# Patient Record
Sex: MALE | Race: WHITE | NOT HISPANIC OR LATINO | Employment: OTHER | URBAN - METROPOLITAN AREA
[De-identification: names, ages, dates, MRNs, and addresses within clinical notes are randomized per-mention and may not be internally consistent; named-entity substitution may affect disease eponyms.]

---

## 2020-01-01 ENCOUNTER — APPOINTMENT (OUTPATIENT)
Dept: NON INVASIVE DIAGNOSTICS | Facility: HOSPITAL | Age: 80
DRG: 250 | End: 2020-01-01
Attending: INTERNAL MEDICINE

## 2020-01-01 ENCOUNTER — APPOINTMENT (EMERGENCY)
Dept: RADIOLOGY | Facility: HOSPITAL | Age: 80
DRG: 250 | End: 2020-01-01

## 2020-01-01 ENCOUNTER — HOSPITAL ENCOUNTER (INPATIENT)
Facility: HOSPITAL | Age: 80
LOS: 1 days | DRG: 250 | End: 2020-01-27
Attending: EMERGENCY MEDICINE | Admitting: INTERNAL MEDICINE

## 2020-01-01 VITALS
RESPIRATION RATE: 30 BRPM | OXYGEN SATURATION: 95 % | TEMPERATURE: 96.8 F | SYSTOLIC BLOOD PRESSURE: 130 MMHG | WEIGHT: 187.39 LBS | HEART RATE: 60 BPM | DIASTOLIC BLOOD PRESSURE: 62 MMHG

## 2020-01-01 DIAGNOSIS — R07.9 CHEST PAIN: ICD-10-CM

## 2020-01-01 DIAGNOSIS — I21.9 AMI (ACUTE MYOCARDIAL INFARCTION) (HCC): ICD-10-CM

## 2020-01-01 DIAGNOSIS — I44.2 HEART BLOCK AV THIRD DEGREE (HCC): ICD-10-CM

## 2020-01-01 DIAGNOSIS — R00.1 BRADYCARDIA: ICD-10-CM

## 2020-01-01 DIAGNOSIS — I21.01 ACUTE ST ELEVATION MYOCARDIAL INFARCTION (STEMI) INVOLVING LEFT MAIN CORONARY ARTERY (HCC): ICD-10-CM

## 2020-01-01 DIAGNOSIS — I21.3 STEMI (ST ELEVATION MYOCARDIAL INFARCTION) (HCC): Primary | ICD-10-CM

## 2020-01-01 LAB
ALBUMIN SERPL BCP-MCNC: 3.2 G/DL (ref 3.5–5)
ALP SERPL-CCNC: 57 U/L (ref 46–116)
ALT SERPL W P-5'-P-CCNC: 77 U/L (ref 12–78)
ANION GAP SERPL CALCULATED.3IONS-SCNC: 17 MMOL/L (ref 4–13)
APTT PPP: 25 SECONDS (ref 23–37)
AST SERPL W P-5'-P-CCNC: 261 U/L (ref 5–45)
ATRIAL RATE: 65 BPM
BASE EXCESS BLDA CALC-SCNC: -15 MMOL/L (ref -2–3)
BASOPHILS # BLD AUTO: 0.03 THOUSANDS/ΜL (ref 0–0.1)
BASOPHILS NFR BLD AUTO: 0 % (ref 0–1)
BILIRUB SERPL-MCNC: 0.66 MG/DL (ref 0.2–1)
BUN SERPL-MCNC: 27 MG/DL (ref 5–25)
CA-I BLD-SCNC: 0.97 MMOL/L (ref 1.12–1.32)
CALCIUM SERPL-MCNC: 8.5 MG/DL (ref 8.3–10.1)
CHLORIDE SERPL-SCNC: 102 MMOL/L (ref 100–108)
CO2 SERPL-SCNC: 16 MMOL/L (ref 21–32)
CREAT SERPL-MCNC: 2.43 MG/DL (ref 0.6–1.3)
EOSINOPHIL # BLD AUTO: 0.01 THOUSAND/ΜL (ref 0–0.61)
EOSINOPHIL NFR BLD AUTO: 0 % (ref 0–6)
ERYTHROCYTE [DISTWIDTH] IN BLOOD BY AUTOMATED COUNT: 13.8 % (ref 11.6–15.1)
GFR SERPL CREATININE-BSD FRML MDRD: 25 ML/MIN/1.73SQ M
GLUCOSE SERPL-MCNC: 317 MG/DL (ref 65–140)
GLUCOSE SERPL-MCNC: 332 MG/DL (ref 65–140)
HCO3 BLDA-SCNC: 12.5 MMOL/L (ref 24–30)
HCT VFR BLD AUTO: 45.5 % (ref 36.5–49.3)
HCT VFR BLD CALC: 46 % (ref 36.5–49.3)
HGB BLD-MCNC: 14.5 G/DL (ref 12–17)
HGB BLDA-MCNC: 15.6 G/DL (ref 12–17)
IMM GRANULOCYTES # BLD AUTO: 0.21 THOUSAND/UL (ref 0–0.2)
IMM GRANULOCYTES NFR BLD AUTO: 1 % (ref 0–2)
INR PPP: 1.35 (ref 0.84–1.19)
LYMPHOCYTES # BLD AUTO: 1.21 THOUSANDS/ΜL (ref 0.6–4.47)
LYMPHOCYTES NFR BLD AUTO: 7 % (ref 14–44)
MCH RBC QN AUTO: 31.3 PG (ref 26.8–34.3)
MCHC RBC AUTO-ENTMCNC: 31.9 G/DL (ref 31.4–37.4)
MCV RBC AUTO: 98 FL (ref 82–98)
MONOCYTES # BLD AUTO: 0.69 THOUSAND/ΜL (ref 0.17–1.22)
MONOCYTES NFR BLD AUTO: 4 % (ref 4–12)
NEUTROPHILS # BLD AUTO: 14.18 THOUSANDS/ΜL (ref 1.85–7.62)
NEUTS SEG NFR BLD AUTO: 88 % (ref 43–75)
NRBC BLD AUTO-RTO: 0 /100 WBCS
PCO2 BLD: 14 MMOL/L (ref 21–32)
PCO2 BLD: 34.5 MM HG (ref 42–50)
PH BLD: 7.17 [PH] (ref 7.3–7.4)
PLATELET # BLD AUTO: 350 THOUSANDS/UL (ref 149–390)
PMV BLD AUTO: 9.6 FL (ref 8.9–12.7)
PO2 BLD: 18 MM HG (ref 35–45)
POTASSIUM BLD-SCNC: 4.5 MMOL/L (ref 3.5–5.3)
POTASSIUM SERPL-SCNC: 4.7 MMOL/L (ref 3.5–5.3)
PROT SERPL-MCNC: 7.1 G/DL (ref 6.4–8.2)
PROTHROMBIN TIME: 16.2 SECONDS (ref 11.6–14.5)
QRS AXIS: -84 DEGREES
QRSD INTERVAL: 170 MS
QT INTERVAL: 520 MS
QTC INTERVAL: 413 MS
RBC # BLD AUTO: 4.64 MILLION/UL (ref 3.88–5.62)
SAO2 % BLD FROM PO2: 18 % (ref 60–85)
SODIUM BLD-SCNC: 136 MMOL/L (ref 136–145)
SODIUM SERPL-SCNC: 135 MMOL/L (ref 136–145)
SPECIMEN SOURCE: ABNORMAL
T WAVE AXIS: 102 DEGREES
TROPONIN I SERPL-MCNC: >40 NG/ML
VENTRICULAR RATE: 38 BPM
WBC # BLD AUTO: 16.33 THOUSAND/UL (ref 4.31–10.16)

## 2020-01-01 PROCEDURE — 82803 BLOOD GASES ANY COMBINATION: CPT

## 2020-01-01 PROCEDURE — 93010 ELECTROCARDIOGRAM REPORT: CPT | Performed by: INTERNAL MEDICINE

## 2020-01-01 PROCEDURE — 36415 COLL VENOUS BLD VENIPUNCTURE: CPT | Performed by: EMERGENCY MEDICINE

## 2020-01-01 PROCEDURE — 92950 HEART/LUNG RESUSCITATION CPR: CPT

## 2020-01-01 PROCEDURE — 85014 HEMATOCRIT: CPT

## 2020-01-01 PROCEDURE — 5A1223Z PERFORMANCE OF CARDIAC PACING, CONTINUOUS: ICD-10-PCS | Performed by: INTERNAL MEDICINE

## 2020-01-01 PROCEDURE — 85025 COMPLETE CBC W/AUTO DIFF WBC: CPT | Performed by: EMERGENCY MEDICINE

## 2020-01-01 PROCEDURE — C1894 INTRO/SHEATH, NON-LASER: HCPCS | Performed by: INTERNAL MEDICINE

## 2020-01-01 PROCEDURE — C1725 CATH, TRANSLUMIN NON-LASER: HCPCS | Performed by: INTERNAL MEDICINE

## 2020-01-01 PROCEDURE — 94002 VENT MGMT INPAT INIT DAY: CPT

## 2020-01-01 PROCEDURE — 5A1935Z RESPIRATORY VENTILATION, LESS THAN 24 CONSECUTIVE HOURS: ICD-10-PCS | Performed by: EMERGENCY MEDICINE

## 2020-01-01 PROCEDURE — 99285 EMERGENCY DEPT VISIT HI MDM: CPT | Performed by: EMERGENCY MEDICINE

## 2020-01-01 PROCEDURE — 99291 CRITICAL CARE FIRST HOUR: CPT

## 2020-01-01 PROCEDURE — 93454 CORONARY ARTERY ANGIO S&I: CPT | Performed by: INTERNAL MEDICINE

## 2020-01-01 PROCEDURE — 82947 ASSAY GLUCOSE BLOOD QUANT: CPT

## 2020-01-01 PROCEDURE — 85610 PROTHROMBIN TIME: CPT | Performed by: EMERGENCY MEDICINE

## 2020-01-01 PROCEDURE — C1887 CATHETER, GUIDING: HCPCS | Performed by: INTERNAL MEDICINE

## 2020-01-01 PROCEDURE — B2111ZZ FLUOROSCOPY OF MULTIPLE CORONARY ARTERIES USING LOW OSMOLAR CONTRAST: ICD-10-PCS | Performed by: INTERNAL MEDICINE

## 2020-01-01 PROCEDURE — 02703ZZ DILATION OF CORONARY ARTERY, ONE ARTERY, PERCUTANEOUS APPROACH: ICD-10-PCS | Performed by: INTERNAL MEDICINE

## 2020-01-01 PROCEDURE — 84132 ASSAY OF SERUM POTASSIUM: CPT

## 2020-01-01 PROCEDURE — 85730 THROMBOPLASTIN TIME PARTIAL: CPT | Performed by: EMERGENCY MEDICINE

## 2020-01-01 PROCEDURE — 0BH18EZ INSERTION OF ENDOTRACHEAL AIRWAY INTO TRACHEA, VIA NATURAL OR ARTIFICIAL OPENING ENDOSCOPIC: ICD-10-PCS | Performed by: EMERGENCY MEDICINE

## 2020-01-01 PROCEDURE — 92950 HEART/LUNG RESUSCITATION CPR: CPT | Performed by: INTERNAL MEDICINE

## 2020-01-01 PROCEDURE — 80053 COMPREHEN METABOLIC PANEL: CPT | Performed by: EMERGENCY MEDICINE

## 2020-01-01 PROCEDURE — 31500 INSERT EMERGENCY AIRWAY: CPT | Performed by: EMERGENCY MEDICINE

## 2020-01-01 PROCEDURE — C1769 GUIDE WIRE: HCPCS | Performed by: INTERNAL MEDICINE

## 2020-01-01 PROCEDURE — 82330 ASSAY OF CALCIUM: CPT

## 2020-01-01 PROCEDURE — 93005 ELECTROCARDIOGRAM TRACING: CPT

## 2020-01-01 PROCEDURE — 33210 INSERT ELECTRD/PM CATH SNGL: CPT | Performed by: INTERNAL MEDICINE

## 2020-01-01 PROCEDURE — C9606 PERC D-E COR REVASC W AMI S: HCPCS | Performed by: INTERNAL MEDICINE

## 2020-01-01 PROCEDURE — 84484 ASSAY OF TROPONIN QUANT: CPT | Performed by: EMERGENCY MEDICINE

## 2020-01-01 PROCEDURE — 84295 ASSAY OF SERUM SODIUM: CPT

## 2020-01-01 PROCEDURE — 5A12012 PERFORMANCE OF CARDIAC OUTPUT, SINGLE, MANUAL: ICD-10-PCS | Performed by: INTERNAL MEDICINE

## 2020-01-01 PROCEDURE — NC001 PR NO CHARGE: Performed by: INTERNAL MEDICINE

## 2020-01-01 PROCEDURE — 92920 PRQ TRLUML C ANGIOP 1ART&/BR: CPT | Performed by: INTERNAL MEDICINE

## 2020-01-01 RX ORDER — LIDOCAINE HYDROCHLORIDE 10 MG/ML
INJECTION, SOLUTION EPIDURAL; INFILTRATION; INTRACAUDAL; PERINEURAL CODE/TRAUMA/SEDATION MEDICATION
Status: COMPLETED | OUTPATIENT
Start: 2020-01-01 | End: 2020-01-01

## 2020-01-01 RX ORDER — ATROPINE SULFATE 1 MG/ML
INJECTION, SOLUTION INTRAMUSCULAR; INTRAVENOUS; SUBCUTANEOUS CODE/TRAUMA/SEDATION MEDICATION
Status: COMPLETED | OUTPATIENT
Start: 2020-01-01 | End: 2020-01-01

## 2020-01-01 RX ORDER — FENTANYL CITRATE-0.9 % NACL/PF 10 MCG/ML
100 PLASTIC BAG, INJECTION (ML) INTRAVENOUS CONTINUOUS
Status: DISCONTINUED | OUTPATIENT
Start: 2020-01-01 | End: 2020-01-28 | Stop reason: HOSPADM

## 2020-01-01 RX ORDER — HEPARIN SODIUM 1000 [USP'U]/ML
INJECTION, SOLUTION INTRAVENOUS; SUBCUTANEOUS CODE/TRAUMA/SEDATION MEDICATION
Status: COMPLETED | OUTPATIENT
Start: 2020-01-01 | End: 2020-01-01

## 2020-01-01 RX ORDER — SODIUM CHLORIDE, SODIUM GLUCONATE, SODIUM ACETATE, POTASSIUM CHLORIDE, MAGNESIUM CHLORIDE, SODIUM PHOSPHATE, DIBASIC, AND POTASSIUM PHOSPHATE .53; .5; .37; .037; .03; .012; .00082 G/100ML; G/100ML; G/100ML; G/100ML; G/100ML; G/100ML; G/100ML
INJECTION, SOLUTION INTRAVENOUS
Status: COMPLETED | OUTPATIENT
Start: 2020-01-01 | End: 2020-01-01

## 2020-01-01 RX ORDER — DIAZEPAM 5 MG/ML
10 INJECTION, SOLUTION INTRAMUSCULAR; INTRAVENOUS ONCE
Status: COMPLETED | OUTPATIENT
Start: 2020-01-01 | End: 2020-01-01

## 2020-01-01 RX ORDER — SUCCINYLCHOLINE/SOD CL,ISO/PF 100 MG/5ML
SYRINGE (ML) INTRAVENOUS CODE/TRAUMA/SEDATION MEDICATION
Status: COMPLETED | OUTPATIENT
Start: 2020-01-01 | End: 2020-01-01

## 2020-01-01 RX ORDER — ETOMIDATE 2 MG/ML
INJECTION INTRAVENOUS CODE/TRAUMA/SEDATION MEDICATION
Status: COMPLETED | OUTPATIENT
Start: 2020-01-01 | End: 2020-01-01

## 2020-01-01 RX ORDER — EPTIFIBATIDE 20 MG/10ML
INJECTION INTRAVENOUS CODE/TRAUMA/SEDATION MEDICATION
Status: COMPLETED | OUTPATIENT
Start: 2020-01-01 | End: 2020-01-01

## 2020-01-01 RX ORDER — FENTANYL CITRATE 50 UG/ML
100 INJECTION, SOLUTION INTRAMUSCULAR; INTRAVENOUS ONCE
Status: COMPLETED | OUTPATIENT
Start: 2020-01-01 | End: 2020-01-01

## 2020-01-01 RX ORDER — CALCIUM CHLORIDE 100 MG/ML
INJECTION INTRAVENOUS; INTRAVENTRICULAR CODE/TRAUMA/SEDATION MEDICATION
Status: COMPLETED | OUTPATIENT
Start: 2020-01-01 | End: 2020-01-01

## 2020-01-01 RX ADMIN — SODIUM BICARBONATE 50 MEQ: 84 INJECTION PARENTERAL at 21:09

## 2020-01-01 RX ADMIN — FENTANYL CITRATE 100 MCG: 50 INJECTION, SOLUTION INTRAMUSCULAR; INTRAVENOUS at 20:20

## 2020-01-01 RX ADMIN — HEPARIN SODIUM 4000 UNITS: 1000 INJECTION INTRAVENOUS; SUBCUTANEOUS at 20:26

## 2020-01-01 RX ADMIN — Medication 50 MCG/HR: at 20:25

## 2020-01-01 RX ADMIN — Medication 100 MG: at 20:06

## 2020-01-01 RX ADMIN — EPTIFIBATIDE 7.6 ML: 2 INJECTION, SOLUTION INTRAVENOUS at 21:15

## 2020-01-01 RX ADMIN — SODIUM BICARBONATE 50 MEQ: 84 INJECTION PARENTERAL at 21:14

## 2020-01-01 RX ADMIN — ETOMIDATE 20 MG: 20 INJECTION, SOLUTION INTRAVENOUS at 20:18

## 2020-01-01 RX ADMIN — EPINEPHRINE 1 MG: 0.1 INJECTION INTRACARDIAC; INTRAVENOUS at 21:07

## 2020-01-01 RX ADMIN — EPINEPHRINE 1 MG: 0.1 INJECTION INTRACARDIAC; INTRAVENOUS at 21:05

## 2020-01-01 RX ADMIN — DIAZEPAM 10 MG: 10 INJECTION, SOLUTION INTRAMUSCULAR; INTRAVENOUS at 20:19

## 2020-01-01 RX ADMIN — ATROPINE SULFATE 0.5 MG: 1 INJECTION, SOLUTION INTRAMUSCULAR; INTRAVENOUS; SUBCUTANEOUS at 19:59

## 2020-01-01 RX ADMIN — CALCIUM CHLORIDE 1 G: 100 INJECTION, SOLUTION INTRAVENOUS at 21:13

## 2020-01-01 RX ADMIN — EPTIFIBATIDE 7.6 ML: 2 INJECTION, SOLUTION INTRAVENOUS at 20:59

## 2020-01-01 RX ADMIN — SODIUM BICARBONATE 50 MEQ: 84 INJECTION PARENTERAL at 21:13

## 2020-01-01 RX ADMIN — SODIUM CHLORIDE, SODIUM GLUCONATE, SODIUM ACETATE, POTASSIUM CHLORIDE, MAGNESIUM CHLORIDE, SODIUM PHOSPHATE, DIBASIC, AND POTASSIUM PHOSPHATE 1000 ML: .53; .5; .37; .037; .03; .012; .00082 INJECTION, SOLUTION INTRAVENOUS at 20:10

## 2020-01-01 RX ADMIN — NOREPINEPHRINE BITARTRATE 5 MCG/MIN: 1 INJECTION INTRAVENOUS at 20:08

## 2020-01-01 RX ADMIN — BIVALIRUDIN 1.75 MG/KG/HR: 250 INJECTION, POWDER, LYOPHILIZED, FOR SOLUTION INTRAVENOUS at 21:03

## 2020-01-01 RX ADMIN — ETOMIDATE 20 MG: 20 INJECTION, SOLUTION INTRAVENOUS at 20:05

## 2020-01-01 RX ADMIN — LIDOCAINE HYDROCHLORIDE 8 ML: 10 INJECTION, SOLUTION EPIDURAL; INFILTRATION; INTRACAUDAL; PERINEURAL at 20:49

## 2020-01-01 RX ADMIN — IOHEXOL 60 ML: 350 INJECTION, SOLUTION INTRAVENOUS at 21:24

## 2020-01-27 NOTE — UTILIZATION REVIEW
Initial Clinical Review    Admission: Date/Time/Statement: Inpatient Admission Orders (From admission, onward)     Ordered        01/26/20 2051  Inpatient Admission  Once                   Orders Placed This Encounter   Procedures    Inpatient Admission     Standing Status:   Standing     Number of Occurrences:   1     Order Specific Question:   Admitting Physician     Answer:   Bernard oTribio [[id number]]     Order Specific Question:   Level of Care     Answer:   Critical Care [15]     Order Specific Question:   Estimated length of stay     Answer:   More than 2 Midnights     Order Specific Question:   Certification     Answer:   I certify that inpatient services are medically necessary for this patient for a duration of greater than two midnights  See H&P and MD Progress Notes for additional information about the patient's course of treatment  ED Arrival Information     Expected Arrival Acuity Means of Arrival Escorted By Service Admission Type    - 1/26/2020 19:52 - Ambulance SLETS DEPARTMENT [facility]) Emergency Medicine -    Arrival Complaint    chest pain         Chief Complaint   Patient presents with    Chest Pain     Pt woke up with chest pain this morning  Described at pounding and burning  He also felt dizzy  EMS found patient lying on the floor  Assessment/Plan: 67 y o  male with no known prior medical history, loss to follow up with physicians who presents with substernal chest pain  Per documentation/staff he woke up with chest pain this morning, pounding/burning sensation, also a/q dizziness  He was found on the floor per EMS  Patient was emergently intubated  In the ED he developed CHB and was transcutaneously paced  At the same time he had hypotension requiring Levophed gtt support  EKG in the field demonstratted deep anterolateral ST depresions, RBBB  Follow up EKG demonstrated anterolateral STEMI  First troponin >40  STEMI alert was called     Patient became persistently more bradycardic and decision was made to give push dose atropine  Patient temporarily went up to rate of 60 but within 30 seconds dropped down to HR in 20s-30s  Decision was made to intubate and transcutaneously pace  Patient paced at a rate of 80 and requiring 150 current for mechanical capture  Patient was started on norepinephrine drip at a rate of 10 to maintain normotensive blood pressure  Critical care at the bedside to assess  Patient transferred to cath lab  Assessment:  # Anterolateral STEMI  # VDRF  # Hyperglycemia/DKA  # TASNEEM and/or CKD  # Transaminitis     Plan:  1  Proceed with emergent [facility]  CARDIAC CATH NOTE - 1/26 @ 9:36 PM    Patient presented to cath lab as a STEMI, VDRF, CHB with transcutaneous pacing, hypotensive on levophed gtt  R femoral TVP was placed with good capture  [facility] demonstrated an LAD that was totally occluded, LCX 90%  PTCA was done to LAD with subsequent decompensation to VF  CPR was initiated  A code blue was called overhead  Patient was defibrillated with conversion to PEA  A total of 5mg of epi, 3 amps of bicarb, 1 amp of calcium chloride were given  The critical care team was present for the last half of the code  Rhythm remained PEA on subsequent checks  The code was terminated and the patient was pronounced      Time of Death 21:17      ED Triage Vitals [01/26/20 1951]   Temperature Pulse Respirations Blood Pressure SpO2   (!) 96 8 °F (36 °C) (!) 45 (!) 30 (!) 86/54 92 %      Temp Source Heart Rate Source Patient Position - Orthostatic VS BP Location FiO2 (%)   Rectal Monitor -- -- --      Pain Score       --        Wt Readings from Last 1 Encounters:   01/26/20 85 kg (187 lb 6 3 oz)     Additional Vital Signs:   Date/Time  Temp  Pulse  Resp  BP  SpO2   01/26/20 20:30:41    60  30Abnormal   130/62  95 %   01/26/20 2020    55  74Abnormal   95/52  99 %   01/26/20 20:17:27    53Abnormal   30Abnormal   147/79  91 %   01/26/20 20:08:47    48Abnormal   29Abnormal   94/60  96 %   01/26/20 2000    57  30Abnormal   102/56  95 %         Pertinent Labs/Diagnostic Test Results:   Results from last 7 days   Lab Units 01/26/20 1959 01/26/20 1957   WBC Thousand/uL 16 33*  --    HEMOGLOBIN g/dL 14 5  --    I STAT HEMOGLOBIN g/dl  --  15 6   HEMATOCRIT % 45 5  --    HEMATOCRIT, ISTAT %  --  46   PLATELETS Thousands/uL 350  --    NEUTROS ABS Thousands/µL 14 18*  --      Results from last 7 days   Lab Units 01/26/20 1959 01/26/20 1957   SODIUM mmol/L 135*  --    POTASSIUM mmol/L 4 7  --    CHLORIDE mmol/L 102  --    CO2 mmol/L 16*  --    CO2, I-STAT mmol/L  --  14*   ANION GAP mmol/L 17*  --    BUN mg/dL 27*  --    CREATININE mg/dL 2 43*  --    EGFR ml/min/1 73sq m 25  --    CALCIUM mg/dL 8 5  --    CALCIUM, IONIZED, ISTAT mmol/L  --  0 97*     Results from last 7 days   Lab Units 01/26/20 1959   AST U/L 261*   ALT U/L 77   ALK PHOS U/L 57   TOTAL PROTEIN g/dL 7 1   ALBUMIN g/dL 3 2*   TOTAL BILIRUBIN mg/dL 0 66     Results from last 7 days   Lab Units 01/26/20 1959   GLUCOSE RANDOM mg/dL 317*     Results from last 7 days   Lab Units 01/26/20 1957   PH, ADRY I-STAT  7 167*   PCO2, ADRY ISTAT mm HG 34 5*   PO2, ADRY ISTAT mm HG 18 0*   HCO3, ADRY ISTAT mmol/L 12 5*   I STAT BASE EXC mmol/L -15*   I STAT O2 SAT % 18*     Results from last 7 days   Lab Units 01/26/20 1959   TROPONIN I ng/mL >40 00*     Results from last 7 days   Lab Units 01/26/20 1959   PROTIME seconds 16 2*   INR  1 35*   PTT seconds 25     EKG anterolateral STEMI    ED Treatment:   Medication Administration from 01/26/2020 1952 to 01/27/2020 0748       Date/Time Order Dose Route Action     01/26/2020 1959 atropine injection 0 5 mg Intravenous Given     01/26/2020 2018 etomidate (AMIDATE) 2 mg/mL injection 20 mg Intravenous Given     01/26/2020 2005 etomidate (AMIDATE) 2 mg/mL injection 20 mg Intravenous Given     01/26/2020 2006 Succinylcholine Chloride 100 mg/5 mL syringe 100 mg Intravenous Given     01/26/2020 2008 norepinephrine (LEVOPHED) 4 mg (STANDARD CONCENTRATION) IV in sodium chloride 0 9% 250 mL 5 mcg/min Intravenous New Bag     01/26/2020 2010 multi-electrolyte (ISOLYTE-S PH 7 4) bolus 1,000 mL Intravenous New Bag     01/26/2020 2019 diazepam (VALIUM) injection 10 mg 10 mg Intravenous Given     01/26/2020 2020 fentanyl citrate (PF) 100 MCG/2ML 100 mcg 100 mcg Intravenous Given     01/26/2020 2025 fentaNYL 1000 mcg in sodium chloride 0 9% 100mL infusion 50 mcg/hr Intravenous New Bag     01/26/2020 2026 heparin (porcine) injection 4,000 Units Intravenous Given     01/26/2020 2049 lidocaine (PF) (XYLOCAINE-MPF) 1 % injection 8 mL Infiltration Given     01/26/2020 2059 bivalirudin (ANGIOMAX) bolus from bag 13 mL Intravenous Given     01/26/2020 2115 eptifibatide (INTEGRILIN) 20 MG/10ML (premix) 7 6 mL Intravenous Given     01/26/2020 2059 eptifibatide (INTEGRILIN) 20 MG/10ML (premix) 7 6 mL Intravenous Given     01/26/2020 2103 Bivalirudin Trifluoroacetate (ANGIOMAX) 250 mg in sodium chloride 0 9 % 50 mL infusion 1 75 mg/kg/hr Intravenous New Bag     01/26/2020 2107 EPINEPHrine (ADRENALIN) injection 1 mg Intravenous Given     01/26/2020 2105 EPINEPHrine (ADRENALIN) injection 1 mg Intravenous Given     01/26/2020 2114 sodium bicarbonate 8 4 % injection 50 mEq Intravenous Given     01/26/2020 2113 sodium bicarbonate 8 4 % injection 50 mEq Intravenous Given     01/26/2020 2109 sodium bicarbonate 8 4 % injection 50 mEq Intravenous Given     01/26/2020 2113 calcium chloride 10 % injection 1 g Intravenous Given        Past Medical History:   Diagnosis Date    Patient denies medical problems      Admitting Diagnosis: Chest pain [R07 9]  Age/Sex: 67 y o  male  Admission Orders:  Scheduled Medications:     Continuous IV Infusions:    PRN Meds:           Network Utilization Review Department  [email or web address]  ATTENTION: Please call with any questions or concerns to [phone] and carefully listen to the prompts so that you are directed to the right person  All voicemails are confidential   Kim Shah all requests for admission clinical reviews, approved or denied determinations and any other requests to dedicated fax number below belonging to the campus where the patient is receiving treatment   List of dedicated fax numbers for the Facilities:  [address] [place] [address] DENIALS (Administrative/Medical Necessity) [phone]   [address] (Maternity/NICU/Pediatrics) [phone]   Christianne Joseph [phone]   Brent Florez [phone]   [facility] [phone]   [facility] [phone]   [address] [place] [address] [phone]   [facility]  [phone]   [address] [facility]  [address] [facility] [address] [facility] [phone]

## 2020-01-27 NOTE — CODE DOCUMENTATION
Patient presented to cath lab as a STEMI, VDRF, CHB with transcutaneous pacing, hypotensive on levophed gtt  R femoral TVP was placed with good capture  LHC demonstrated an LAD that was totally occluded, LCX 90%  PTCA was done to LAD with subsequent decompensation to VF  CPR was initiated  A code blue was called overhead  Patient was defibrillated with conversion to PEA  A total of 5mg of epi, 3 amps of bicarb, 1 amp of calcium chloride were given  The critical care team was present for the last half of the code  Rhythm remained PEA on subsequent checks  The code was terminated and the patient was pronounced

## 2020-01-27 NOTE — ED PROVIDER NOTES
History  Chief Complaint   Patient presents with    Chest Pain     Pt woke up with chest pain this morning  Described at pounding and burning  He also felt dizzy  EMS found patient lying on the floor  67-year-old male with no known medical problems presents by ambulance for chest pain with abnormal EKG findings  History limited due to emergent situation  Patient developed chest pain when he woke up this morning and has been constant since onset  He has also had shortness of breath and lightheadedness  EMS found patient lying on the floor on their arrival   He is found have a right bundle branch block with mild ST elevations in the anterior leads, but no obvious reciprocal changes  He was given full-dose aspirin EN route  Patient intermittently bradycardic down to the 40s EN route  Initial systolic blood pressure per EMS was 90, but improved to 115  Patient answered questions appropriately  None       Past Medical History:   Diagnosis Date    Patient denies medical problems        History reviewed  No pertinent surgical history  History reviewed  No pertinent family history  I have reviewed and agree with the history as documented  Social History     Tobacco Use    Smoking status: Not on file   Substance Use Topics    Alcohol use: Not on file    Drug use: Not on file        Review of Systems   Unable to perform ROS: Acuity of condition       Physical Exam  ED Triage Vitals [01/26/20 1951]   Temperature Pulse Respirations Blood Pressure SpO2   (!) 96 8 °F (36 °C) (!) 45 (!) 30 (!) 86/54 92 %      Temp Source Heart Rate Source Patient Position - Orthostatic VS BP Location FiO2 (%)   Rectal Monitor -- -- --      Pain Score       --             Orthostatic Vital Signs  Vitals:    01/26/20 2008 01/26/20 2017 01/26/20 2020 01/26/20 2030   BP: 94/60 147/79 95/52 130/62   Pulse: (!) 48 (!) 53 55 60       Physical Exam   Constitutional: He is oriented to person, place, and time   He appears well-developed  Patient appears uncomfortable   HENT:   Head: Normocephalic and atraumatic  Mouth/Throat: Oropharynx is clear and moist    Eyes: EOM are normal    Neck: Normal range of motion  Neck supple  Cardiovascular: Regular rhythm, normal heart sounds and intact distal pulses  Bradycardia present  Exam reveals no gallop and no friction rub  No murmur heard  Pulses:       Radial pulses are 2+ on the right side, and 2+ on the left side  Dorsalis pedis pulses are 2+ on the right side, and 2+ on the left side  Pulmonary/Chest: Breath sounds normal  Tachypnea noted  He has no wheezes  He has no rales  Mildly coarse breath sounds bilaterally   Abdominal: Soft  There is no tenderness  There is no rebound and no guarding  Musculoskeletal: He exhibits no edema or tenderness  Neurological: He is alert and oriented to person, place, and time  Clear fluent speech   Skin: Skin is warm and dry  Capillary refill takes less than 2 seconds  Psychiatric: He has a normal mood and affect  Nursing note and vitals reviewed        ED Medications  Medications   fentaNYL 1000 mcg in sodium chloride 0 9% 100mL infusion (50 mcg/hr Intravenous New Bag 1/26/20 2025)   atropine injection (0 5 mg Intravenous Given 1/26/20 1959)   etomidate (AMIDATE) 2 mg/mL injection (20 mg Intravenous Given 1/26/20 2018)   Succinylcholine Chloride 100 mg/5 mL syringe (100 mg Intravenous Given 1/26/20 2006)   norepinephrine (LEVOPHED) 4 mg (STANDARD CONCENTRATION) IV in sodium chloride 0 9% 250 mL (5 mcg/min Intravenous New Bag 1/26/20 2008)   multi-electrolyte (ISOLYTE-S PH 7 4) bolus (1,000 mL Intravenous New Bag 1/26/20 2010)   diazepam (VALIUM) injection 10 mg (10 mg Intravenous Given 1/26/20 2019)   fentanyl citrate (PF) 100 MCG/2ML 100 mcg (100 mcg Intravenous Given 1/26/20 2020)   heparin (porcine) injection (4,000 Units Intravenous Given 1/26/20 2026)   lidocaine (PF) (XYLOCAINE-MPF) 1 % injection (8 mL Infiltration Given 1/26/20 2049)   bivalirudin (ANGIOMAX) bolus from bag (13 mL Intravenous Given 1/26/20 2059)   eptifibatide (INTEGRILIN) 20 MG/10ML (premix) (7 6 mL Intravenous Given 1/26/20 2115)   Bivalirudin Trifluoroacetate (ANGIOMAX) 250 mg in sodium chloride 0 9 % 50 mL infusion (1 75 mg/kg/hr × 85 kg Intravenous New Bag 1/26/20 2103)   EPINEPHrine (ADRENALIN) injection (1 mg Intravenous Given 1/26/20 2107)   sodium bicarbonate 8 4 % injection (50 mEq Intravenous Given 1/26/20 2114)   calcium chloride 10 % injection (1 g Intravenous Given 1/26/20 2113)   iohexol (OMNIPAQUE) 350 MG/ML injection (SINGLE-DOSE) (60 mL Intravenous Given 1/26/20 2124)       Diagnostic Studies  Results Reviewed     Procedure Component Value Units Date/Time    Comprehensive metabolic panel [[id number]]  (Abnormal) Collected:  01/26/20 1959    Lab Status:  Final result Specimen:  Blood from Arm, Right Updated:  01/26/20 2032     Sodium 135 mmol/L      Potassium 4 7 mmol/L      Chloride 102 mmol/L      CO2 16 mmol/L      ANION GAP 17 mmol/L      BUN 27 mg/dL      Creatinine 2 43 mg/dL      Glucose 317 mg/dL      Calcium 8 5 mg/dL       U/L      ALT 77 U/L      Alkaline Phosphatase 57 U/L      Total Protein 7 1 g/dL      Albumin 3 2 g/dL      Total Bilirubin 0 66 mg/dL      eGFR 25 ml/min/1 73sq m     Narrative:       [place] guidelines for Chronic Kidney Disease (CKD):     Stage 1 with normal or high GFR (GFR > 90 mL/min/1 73 square meters)    Stage 2 Mild CKD (GFR = 60-89 mL/min/1 73 square meters)    Stage 3A Moderate CKD (GFR = 45-59 mL/min/1 73 square meters)    Stage 3B Moderate CKD (GFR = 30-44 mL/min/1 73 square meters)    Stage 4 Severe CKD (GFR = 15-29 mL/min/1 73 square meters)    Stage 5 End Stage CKD (GFR <15 mL/min/1 73 square meters)  Note: GFR calculation is accurate only with a steady state creatinine    Troponin I [[id number]]  (Abnormal) Collected:  01/26/20 1959    Lab Status:  Final result Specimen:  Blood from Arm, Right Updated:  01/26/20 2029     Troponin I >40 00 ng/mL     Protime-INR [[id number]]  (Abnormal) Collected:  01/26/20 1959    Lab Status:  Final result Specimen:  Blood from Arm, Right Updated:  01/26/20 2026     Protime 16 2 seconds      INR 1 35    APTT [[id number]]  (Normal) Collected:  01/26/20 1959    Lab Status:  Final result Specimen:  Blood from Arm, Right Updated:  01/26/20 2026     PTT 25 seconds     CBC and differential [[id number]]  (Abnormal) Collected:  01/26/20 1959    Lab Status:  Final result Specimen:  Blood from Arm, Right Updated:  01/26/20 2009     WBC 16 33 Thousand/uL      RBC 4 64 Million/uL      Hemoglobin 14 5 g/dL      Hematocrit 45 5 %      MCV 98 fL      MCH 31 3 pg      MCHC 31 9 g/dL      RDW 13 8 %      MPV 9 6 fL      Platelets 582 Thousands/uL      nRBC 0 /100 WBCs      Neutrophils Relative 88 %      Immat GRANS % 1 %      Lymphocytes Relative 7 %      Monocytes Relative 4 %      Eosinophils Relative 0 %      Basophils Relative 0 %      Neutrophils Absolute 14 18 Thousands/µL      Immature Grans Absolute 0 21 Thousand/uL      Lymphocytes Absolute 1 21 Thousands/µL      Monocytes Absolute 0 69 Thousand/µL      Eosinophils Absolute 0 01 Thousand/µL      Basophils Absolute 0 03 Thousands/µL     POCT Blood Gas (CG8+) [[id number]]  (Abnormal) Collected:  01/26/20 1957    Lab Status:  Final result Specimen:  Venous Updated:  01/26/20 2001     ph, [place] ISTAT 7 167     pCO2, [place] i-STAT 34 5 mm HG      pO2, [place] i-STAT 18 0 mm HG      BE, i-STAT -15 mmol/L      HCO3, [place] i-STAT 12 5 mmol/L      CO2, i-STAT 14 mmol/L      O2 Sat, i-STAT 18 %      SODIUM, I-STAT 136 mmol/l      Potassium, i-STAT 4 5 mmol/L      Calcium, Ionized i-STAT 0 97 mmol/L      Hct, i-STAT 46 %      Hgb, i-STAT 15 6 g/dl      Glucose, i-STAT 332 mg/dl      Specimen Type VENOUS                 XR chest 2 views    (Results Pending)         Procedures  Intubation  Date/Time: 1/26/2020 8:55 PM  Performed by: Claudia Hedrick MD  Authorized by: Claudia Hedrick MD     Patient location:  ED  Consent:     Consent obtained:  Emergent situation  Universal protocol:     Patient identity confirmed:  Verbally with patient and arm band  Pre-procedure details:     Patient status:  Awake    Mallampati score:  2    Pretreatment medications:  Etomidate    Paralytics:  Succinylcholine  Indications:     Indications for intubation: airway protection    Procedure details:     Preoxygenation:  Bag valve mask    CPR in progress: no      Intubation method:  Oral    Oral intubation technique:  Glidescope    Laryngoscope blade: Mac 3    Tube size (mm):  8 0    Tube type:  Cuffed    Number of attempts:  1  Placement assessment:     ETT to lip:  24    Tube secured with: Adhesive tape    Breath sounds:  Equal    Placement verification: chest rise, condensation, direct visualization, equal breath sounds, ETCO2 detector, tube exhalation and capnography    Post-procedure details:     Patient tolerance of procedure: Tolerated well, no immediate complications          ED Course                               MDM  Number of Diagnoses or Management Options  Diagnosis management comments: Patient presenting for chest pain with right bundle branch block and ST elevations in anterior leads, with no old EKG to compare to  Patient found to be acidotic with normal potassium and glucose 450 on POC labs  A STEMI alert was called  Patient became persistently more bradycardic and decision was made to give push dose atropine  Patient temporarily went up to rate of 60 but within 30 seconds dropped down to HR in 20s-30s  Decision was made to intubate and transcutaneously pace  Patient paced at a rate of 80 and requiring 150 current for mechanical capture  Patient was started on norepinephrine drip at a rate of 10 to maintain normotensive blood pressure  Critical care at the bedside to assess    Patient transferred to cath lab         Disposition  Final diagnoses:   STEMI (ST elevation myocardial infarction) ([facility] )   Bradycardia   Heart block AV third degree (HCC)   Chest pain     Time reflects when diagnosis was documented in both MDM as applicable and the Disposition within this note     Time User Action Codes Description Comment    1/26/2020  8:29 PM Brayan Hendrix Add [I21 9] AMI (acute myocardial infarction) ([facility] )     1/26/2020  9:36 PM Paciej, [address] [I21 01] Acute ST elevation myocardial infarction (STEMI) involving left main coronary artery ([facility] )     1/26/2020 10:03 PM Minor, Ana Add [I21 3] STEMI (ST elevation myocardial infarction) ([facility] )     1/26/2020 10:03 PM Minor, Ana Add [R00 1] Bradycardia     1/26/2020 10:03 PM Minor, Ana Add [I44 2] Heart block AV third degree ([facility] )     1/26/2020 10:04 PM Minor, Ana Add [R07 9] Chest pain       ED Disposition     ED Disposition Condition Date/Time Comment    Send to Cath Lab  Sun Jan 26, 2020 10:03 PM       Follow-up Information    None         Patient's Medications    No medications on file     No discharge procedures on file  ED Provider  Attending physically available and evaluated Cherry Disla I managed the patient along with the ED Attending      Electronically Signed by         Nannette Reveles MD  01/26/20 6491

## 2020-01-27 NOTE — ED ATTENDING ATTESTATION
1/26/2020  IChava MD, saw and evaluated the patient  I have discussed the patient with the resident/non-physician practitioner and agree with the resident's/non-physician practitioner's findings, Plan of Care, and MDM as documented in the resident's/non-physician practitioner's note, except where noted  All available labs and Radiology studies were reviewed  I was present for key portions of any procedure(s) performed by the resident/non-physician practitioner and I was immediately available to provide assistance  At this point I agree with the current assessment done in the Emergency Department  I have conducted an independent evaluation of this patient a history and physical is as follows:    58-year-old man with unknown past medical history presenting initially as pre-hospital MI alert  The patient had onset of substernal chest pain with radiation to the left arm this morning which worsened tonight  The pain became severe and when EMS arrived to his house they found him lying prone on the floor due to the pain  He was given 324 mg of aspirin by EMS  He was noted to be hypotensive with systolic blood pressure of 90 and also to have heart rate going down as low as 40 by EMS and nitroglycerin was held  EKGs obtained by EMS showed right bundle branch block, unclear if this was new  There were some nonspecific appearing elevations and it was very difficult to say if this was a STEMI or not, however given uncertainty it if right bundle branch block was new and given the patient's symptoms and vital signs I did confirm with EMS that we would make this a pre-hospital MI alert  On arrival the patient appeared in significant distress with tachypnea at a rate of approximately 30, and pale cool skin  He was immediately put on the monitor and pads were placed  His heart rate was going as low as 30 and on the monitor it did appear that he was in third-degree heart block    Initial i-STAT blood gas showed significant metabolic acidosis  At that time I discussed with Cardiology, Dr Kaden Zhao, and there were concerns given the patient's severe degree of illness on whether or not he should be taken straight cath lab were not  Plan then became to cancel the MI alert and have the patient evaluated by critical care with decision made in consultation with critical care whether MI alert should be called again  Patient's respiratory status continued to worsen and he became more bradycardic with systolic blood pressure again in the high 80s to low 90s  Continued to have appearance of third-degree heart block on the monitor  Our initial EKG was fairly consistent with EMS EKGs  Given the patient's clinical picture decision was made to intubate both for airway protection and so that the patient could be sedated and transcutaneously paced  We did ensure high respiratory rate by Ambu bag during RSI procedure and after intubation patient was placed on the ventilator at respiratory rate of 30  Intubation was done by video laryngoscopy and I did see the endotracheal tube go through cords  Patient maintained O2 sat of 100% while in the emergency department and there was condensation in the tube  There were bilateral breath sounds  The patient was started on a norepinephrine drip and transcutaneously paced  He ultimately required current of 150 milliamps in order to maintain mechanical capture  He was given 4000 units of heparin in the emergency department  We continued to manage the patient until we were made aware that cath lab was ready and the patient was then transferred to cath lab      ED Course         Critical Care Time  CriticalCare Time  Performed by: Jannette Bowie MD  Authorized by: Jannette Bowie MD     Critical care provider statement:     Critical care time (minutes):  40    Critical care time was exclusive of:  Separately billable procedures and treating other patients and teaching time    Critical care was necessary to treat or prevent imminent or life-threatening deterioration of the following conditions:  Cardiac failure and circulatory failure    Critical care was time spent personally by me on the following activities:  Obtaining history from patient or surrogate, development of treatment plan with patient or surrogate, discussions with consultants, evaluation of patient's response to treatment, examination of patient, interpretation of cardiac output measurements, ordering and performing treatments and interventions, ordering and review of laboratory studies, re-evaluation of patient's condition and ventilator management    I assumed direction of critical care for this patient from another provider in my specialty: no

## 2020-01-27 NOTE — RESPIRATORY THERAPY NOTE
RT Ventilator Management Note  Nati Mehta Crocus 67 y o  male MRN: [id number]  Unit/Bed#: [facility] Encounter: [id number]      Daily Screen     No data found in the last 10 encounters  Physical Exam:   Assessment Type: Assess only  General Appearance: Unresponsive  Respiratory Pattern: Assisted  Chest Assessment: Chest expansion symmetrical  Bilateral Breath Sounds: Clear  Suction: ET Tube(PRN)      Resp Comments: Pt admit to ED for Dx:MI  Intubated by physcicians in the ED  Placed on mechanical ventilator  Settings adjusted for severe metabolic acidosis

## 2020-01-27 NOTE — H&P
H&P Note- Cardiology   Sumanth Luna 67 y o  male MRN: [id number]  Unit/Bed#: ED 22 Encounter: [id number]    Assessment/Plan     Assessment:  # Anterolateral STEMI  # VDRF  # Hyperglycemia/DKA  # TASNEEM and/or CKD  # Transaminitis    Plan:  1  Proceed with emergent [facility]  History of Present Illness   HPI:  Carmen Cordero is a 67 y o  male with no known prior medical history, loss to follow up with physicians who presents with substernal chest pain  Per documentation/staff he woke up with chest pain this morning, pounding/burning sensation, also a/q dizziness  He was found on the floor per EMS  Patient was emergently intubated  In the ED he developed CHB and was transcutaneously paced  At the same time he had hypotension requiring Levophed gtt support  EKG in the field demonstratted deep anterolateral ST depresions, RBBB  Follow up EKG demonstrated anterolateral STEMI  First troponin >40  STEMI alert was called  Patient was loaded with heparin, ASA  No p2y12 was given  NG tube placed  Unable to obtain HPI/ROS due to patient being intubated, emergency situation  Historical Information   No past medical history on file  No past surgical history on file  Social History   Social History     Substance and Sexual Activity   Alcohol Use Not on file     Social History     Substance and Sexual Activity   Drug Use Not on file     Social History     Tobacco Use   Smoking Status Not on file     Family History: No family history on file  Meds/Allergies   all medications and allergies reviewed  Allergies not on file    Objective   Vitals: Blood pressure 130/62, pulse 60, temperature (!) 96 8 °F (36 °C), temperature source Rectal, resp  rate (!) 30, weight 85 kg (187 lb 6 3 oz), SpO2 95 %    Orthostatic Blood Pressures      Most Recent Value   Blood Pressure  130/62 filed at 01/26/2020 2030          No intake or output data in the 24 hours ending 01/26/20 2047    Invasive Devices     Peripheral Intravenous Line Peripheral IV 01/26/20 Right Antecubital less than 1 day    Peripheral IV 01/26/20 Right Hand less than 1 day          Drain            NG/OG Tube Orogastric Right mouth less than 1 day                Review of Systems:  Review of Systems  Except as noted in the HPI and above, a comprehensive 14 point review of systems was negative  Physical Exam   Constitutional: He appears well-developed and well-nourished  Intubated, sadated   HENT:   Head: Normocephalic and atraumatic  Eyes: Pupils are equal, round, and reactive to light  EOM are normal    Neck: Normal range of motion  Neck supple  JVD: unable to appreciate  Cardiovascular: Regular rhythm and intact distal pulses  Exam reveals no gallop and no friction rub  No murmur heard  Paced rhythm, transcutaneously    Pulmonary/Chest: He has no wheezes  He has rales  Coarse vented sounds   Abdominal: Soft  He exhibits no distension  Musculoskeletal: He exhibits no edema  Skin: There is pallor  Cool extremities       Lab Results: I have personally reviewed pertinent lab results  Imaging: I have personally reviewed pertinent reports      EKG: anterolateral STEMI  ECHO: n/a  Previous Cath/PCI: n/a  Code Status: No Order  Advance Directive and Living Will:      Power of :    POLST:        Francia Brambila MD  Cardiology Fellow

## 2020-01-27 NOTE — RESPIRATORY THERAPY NOTE
Respiatory Care      01/26/20 3266   Respiratory Assessment   Resp Comments While pt having cardiac catherization  per physician pt's heart developed into EMD   CPR started  Pt BVM'd while chest compressions being done

## 2020-01-27 NOTE — BRIEF OP NOTE (RAD/CATH)
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  The left main was unobstructed  The circumflex was a relatively small vessel with a 90% obstruction in its mid segment  The right coronary artery was a dominant vessel relatively small with moderate diffuse atherosclerosis  The anterior descending was wired and angioplastied with a 2 5 by 15 mm balloon  Immediately following this the patient went into ventricular fibrillation and he was shocked with 200 joule shock  He had no significant blood pressure in was PEA  Resuscitation efforts were performed however the patient never was able to sustain a blood pressure in spite of irregular heart rhythm  The code blue was subsequently terminated and the patient was pronounced

## 2024-11-13 NOTE — DEATH NOTE
Nati Luna 67 y o  male MRN: [id number]  [address] [facility]   Unit/Bed#: [facility] Encounter: [id number]    Death Pronouncement Note    Patient is identified visually and identification confirmed with hospital identification bracelet and MRN  No spontaneous movements were present  There was no response to verbal or tactile stimuli  no spontaneous respirations present  no breath sounds were appreciated over bilateral lung fields  no heart sounds were auscultated across the precordium  Flat line on two contiguous leads  Time of death: 21:17  Family was notified in person by Dr Michele Turner in the waiting room  Attending physician, Dr Merlin Stai, notified       Ovidio Dowling MD  January 26, 2020
(M6) obeys commands